# Patient Record
Sex: MALE | URBAN - METROPOLITAN AREA
[De-identification: names, ages, dates, MRNs, and addresses within clinical notes are randomized per-mention and may not be internally consistent; named-entity substitution may affect disease eponyms.]

---

## 2017-12-11 ENCOUNTER — ALLIED HEALTH/NURSE VISIT (OUTPATIENT)
Dept: NURSING | Facility: CLINIC | Age: 42
End: 2017-12-11

## 2017-12-11 VITALS — SYSTOLIC BLOOD PRESSURE: 176 MMHG | DIASTOLIC BLOOD PRESSURE: 133 MMHG

## 2017-12-11 DIAGNOSIS — I10 HTN (HYPERTENSION): Primary | ICD-10-CM

## 2017-12-11 PROCEDURE — 99207 ZZC NO CHARGE NURSE ONLY: CPT

## 2017-12-11 NOTE — NURSING NOTE
Patient presented to get on the P-Gen study for Blood Pressure  Per study parameters, patient's blood pressure is too high for study parameters  Patient denies any symptoms of high blood pressure - Specifically denies CP, dizziness, blurred vision or vision changes, SOB  States he feels fine  Per discussion with Reyes Rosa, Dr Price and Dr Escudero patient was advised to go to the ER for evaluation and treatment  Patient states he just got insurance and cannot afford an ER visit.  Patient was then offered an appointment in clinic later today or tomorrow but patient again declined.  States his insurance would not cover here.  Patient was cautioned that a blood pressure this high can be dangerous and could even be fatal.  He continued to refuse ER or clinic appointment.  States he had a root canal done earlier today and had been treated for high blood pressure in the past but quit the meds when he lost his insurance.    States his insurance would not cover at [place]. I advised he should call his insurance company to find a primary care clinic in their system and should be seen ASAP to address the high blood pressure.  Patient will consider. Will look for primary care closer to his home.  Allie Jones RN

## 2017-12-11 NOTE — MR AVS SNAPSHOT
"              After Visit Summary   2017    Diaz Manriquez    MRN: [id number]           Patient Information     Date Of Birth          1975        Visit Information        Provider Department      2017 2:30 PM HW RN/TRIAGE NURSE ONLY [facility]        Today's Diagnoses     HTN (hypertension)    -  1       Follow-ups after your visit        Who to contact     If you have questions or need follow up information about today's clinic visit or your schedule please contact [facility] directly at [phone].  Normal or non-critical lab and imaging results will be communicated to you by [employer], letter or phone within 4 business days after the clinic has received the results. If you do not hear from us within 7 days, please contact the clinic through [employer] or phone. If you have a critical or abnormal lab result, we will notify you by phone as soon as possible.  Submit refill requests through [employer] or call your pharmacy and they will forward the refill request to us. Please allow 3 business days for your refill to be completed.          Additional Information About Your Visit        MyChart Information     [employer] lets you send messages to your doctor, view your test results, renew your prescriptions, schedule appointments and more. To sign up, go to [email or web address] . Click on \"Log in\" on the left side of the screen, which will take you to the Welcome page. Then click on \"Sign up Now\" on the right side of the page.     You will be asked to enter the access code listed below, as well as some personal information. Please follow the directions to create your username and password.     Your access code is: [email or web address]  Expires: 3/11/2018  2:58 PM     Your access code will  in 90 days. If you need help or a new code, please call your [facility] or [phone].        Care EveryWhere ID     This is your Care EveryWhere ID. This could be used by other " organizations to access your [facility] medical records  [id number]         Blood Pressure from Last 3 Encounters:   12/11/17 (!) 176/133    Weight from Last 3 Encounters:   No data found for Wt              Today, you had the following     No orders found for display       Primary Care Provider Office Phone # Fax #    Danay Wells [phone]       [facility] [address] [place] [address]        Equal Access to Services     YVON ABDI : Hadii aad ku hadasho Soomaali, waaxda luqadaha, qaybta kaalmada adeegyada, waxay idiin hayaan adeeg kharash la'aan . So [facility] [phone].    ATENCIÓN: Si habla español, tiene a toussaint disposición servicios gratuitos de asistencia lingüística. Llame al [phone].    We comply with applicable federal civil rights laws and [place] laws. We do not discriminate on the basis of race, color, national origin, age, disability, sex, sexual orientation, or gender identity.            Thank you!     Thank you for choosing [facility]  for your care. Our goal is always to provide you with excellent care. Hearing back from our patients is one way we can continue to improve our services. Please take a few minutes to complete the written survey that you may receive in the mail after your visit with us. Thank you!             Your Updated Medication List - Protect others around you: Learn how to safely use, store and throw away your medicines at www.disposemymeds.org.          This list is accurate as of: 12/11/17  2:58 PM.  Always use your most recent med list.                   Brand Name Dispense Instructions for use Diagnosis    topiramate 50 MG tablet    TOPAMAX    90 tablet    Take three tabs by mouth at bedtime.    Headache(784.0)